# Patient Record
Sex: FEMALE | Race: WHITE | NOT HISPANIC OR LATINO | Employment: UNEMPLOYED | URBAN - METROPOLITAN AREA
[De-identification: names, ages, dates, MRNs, and addresses within clinical notes are randomized per-mention and may not be internally consistent; named-entity substitution may affect disease eponyms.]

---

## 2019-02-28 ENCOUNTER — INITIAL CONSULT (OUTPATIENT)
Dept: RHEUMATOLOGY | Facility: CLINIC | Age: 39
End: 2019-02-28
Payer: COMMERCIAL

## 2019-02-28 VITALS
BODY MASS INDEX: 36.75 KG/M2 | DIASTOLIC BLOOD PRESSURE: 72 MMHG | WEIGHT: 234.13 LBS | HEIGHT: 67 IN | SYSTOLIC BLOOD PRESSURE: 110 MMHG | HEART RATE: 77 BPM

## 2019-02-28 DIAGNOSIS — M79.7 FIBROMYALGIA: Primary | ICD-10-CM

## 2019-02-28 PROCEDURE — 99205 OFFICE O/P NEW HI 60 MIN: CPT | Mod: S$GLB,,, | Performed by: INTERNAL MEDICINE

## 2019-02-28 PROCEDURE — 3008F BODY MASS INDEX DOCD: CPT | Mod: CPTII,S$GLB,, | Performed by: INTERNAL MEDICINE

## 2019-02-28 PROCEDURE — 99205 PR OFFICE/OUTPT VISIT, NEW, LEVL V, 60-74 MIN: ICD-10-PCS | Mod: S$GLB,,, | Performed by: INTERNAL MEDICINE

## 2019-02-28 PROCEDURE — 99999 PR PBB SHADOW E&M-EST. PATIENT-LVL III: ICD-10-PCS | Mod: PBBFAC,,, | Performed by: INTERNAL MEDICINE

## 2019-02-28 PROCEDURE — 3008F PR BODY MASS INDEX (BMI) DOCUMENTED: ICD-10-PCS | Mod: CPTII,S$GLB,, | Performed by: INTERNAL MEDICINE

## 2019-02-28 PROCEDURE — 99999 PR PBB SHADOW E&M-EST. PATIENT-LVL III: CPT | Mod: PBBFAC,,, | Performed by: INTERNAL MEDICINE

## 2019-02-28 RX ORDER — ALBUTEROL SULFATE 90 UG/1
AEROSOL, METERED RESPIRATORY (INHALATION)
COMMUNITY
Start: 2018-12-06 | End: 2021-05-12

## 2019-02-28 RX ORDER — FLUOXETINE HYDROCHLORIDE 20 MG/1
CAPSULE ORAL
COMMUNITY
Start: 2018-12-01 | End: 2019-02-28

## 2019-02-28 RX ORDER — NORGESTIMATE AND ETHINYL ESTRADIOL 0.25-0.035
1 KIT ORAL
COMMUNITY
End: 2019-07-30

## 2019-02-28 RX ORDER — NORGESTIMATE AND ETHINYL ESTRADIOL 0.25-0.035
1 KIT ORAL DAILY
Refills: 3 | COMMUNITY
Start: 2019-01-05 | End: 2019-02-28

## 2019-02-28 RX ORDER — ALBUTEROL SULFATE 90 UG/1
AEROSOL, METERED RESPIRATORY (INHALATION)
COMMUNITY
Start: 2018-12-06 | End: 2019-02-28

## 2019-02-28 RX ORDER — FLUOXETINE HYDROCHLORIDE 20 MG/1
CAPSULE ORAL
COMMUNITY
Start: 2019-02-25 | End: 2021-05-12

## 2019-02-28 RX ORDER — MELOXICAM 15 MG/1
15 TABLET ORAL
COMMUNITY
Start: 2018-12-17 | End: 2019-02-28

## 2019-02-28 RX ORDER — MELOXICAM 15 MG/1
15 TABLET ORAL DAILY
Refills: 5 | COMMUNITY
Start: 2019-01-12 | End: 2019-07-30 | Stop reason: SDUPTHER

## 2019-02-28 RX ORDER — GABAPENTIN 100 MG/1
100 CAPSULE ORAL 3 TIMES DAILY
Qty: 90 CAPSULE | Refills: 6 | Status: SHIPPED | OUTPATIENT
Start: 2019-02-28 | End: 2019-07-30 | Stop reason: SDUPTHER

## 2019-02-28 ASSESSMENT — ROUTINE ASSESSMENT OF PATIENT INDEX DATA (RAPID3)
PSYCHOLOGICAL DISTRESS SCORE: 3.3
PATIENT GLOBAL ASSESSMENT SCORE: 4
TOTAL RAPID3 SCORE: 3.67
PAIN SCORE: 6
MDHAQ FUNCTION SCORE: .3

## 2019-02-28 NOTE — PROGRESS NOTES
Subjective:          Chief Complaint: Pepper Lemon is a 38 y.o. female who had no chief complaint listed for this encounter.    HPI:    Patient is a 38-year-old female being referred by her primary care physician previously seen with rheumatology in [place] for chronic fatigue.   Began 1 year ago she developed Pneumonia and following this she has c/o myalgias, ++fatigue. She endorses non-restorative sleep cycle with young children.   She notes improvement with afternoon nap. With exception of last month where she feels she needs this.   No swelling in her joints.   No specific malar rash. She was found to have some food allegeries she has been doing this for over a year. She does note as she tries restricted foods she begins to feel more fatigued and   She notes dry eye and dry mouth mild  No true weakness. Endorses aches in muscles.   Frontal HA with hx migraine. Seems more frequently.   She notes some tingling in the right hand in median nerve distribution.   She notes with tennis: once weekly she notes tingling in deltoid region   She does endorse some cervical pain. Does see chiropracter some DJD/DDD  She has had some mild elevations in inflammatory markers but her entire autoimmune workup has been negative including FIONA with an FIONA profile.  Most recent C-reactive protein is a 1.4 which is negligible sed rate was normal.  C4 being elevated is not worrisome for anything specific    Patient is currently complaining pain of 4/10 but it is more described as a numbness in her arms fingers with tingling at her neck and upper trapezius region.    She is currently on fluoxetine at 20 mg daily  Meloxicam 15 mg daily.  CPK is normal      Component      Latest Ref Rng & Units 7/21/2018   Thyroperoxidase Antibodies      0.0 - 9.0 IU/mL 1.8   Thyroglobulin Ab Screen      0.0 - 4.0 IU/mL <0.9   SSA Antibody      0 - 40 AU/mL 3   SSA 60 (Ro) ELIEL Antibody      0 - 40 AU/mL 6   Sed Rate      0 - 19 mm/Hr 28 (H)   CRP       0.00 - 0.90 mg/dL 1.60 (H)   Uric Acid      2.4 - 5.7 mg/dL 4.5   FIONA IgG by IFA      <1:80 <1:80   Rheumatoid Factor      0.0 - 15.0 IU/mL <8.6   CCP Antibodies      <5.0 U/mL <0.5   ds DNA Ab      None Detected None Detected   Mitochondrial Ab      0.0 - 20.0 Units 3.7   Smooth Muscle Ab      <1:20 <1:20   HLA B27      Negative Negative   SSB Antibody      0 - 40 AU/mL 2   Complement (C-3)      50 - 180 mg/dL 131   Complement (C-4)      11 - 44 mg/dL 48 (H)     REVIEW OF SYSTEMS:    Review of Systems   Constitutional: Negative for fever, malaise/fatigue and weight loss.   HENT: Negative for sore throat.    Eyes: Negative for double vision, photophobia and redness.   Respiratory: Negative for cough, shortness of breath and wheezing.    Cardiovascular: Negative for chest pain, palpitations and orthopnea.   Gastrointestinal: Negative for abdominal pain, constipation and diarrhea.   Genitourinary: Negative for dysuria, hematuria and urgency.   Musculoskeletal: Positive for joint pain and myalgias. Negative for back pain.   Skin: Negative for rash.   Neurological: Negative for dizziness, tingling, focal weakness and headaches.   Endo/Heme/Allergies: Does not bruise/bleed easily.   Psychiatric/Behavioral: Negative for depression, hallucinations and suicidal ideas.               Objective:            No past medical history on file.  No family history on file.  Social History     Tobacco Use    Smoking status: Not on file   Substance Use Topics    Alcohol use: Not on file    Drug use: Not on file         No current outpatient medications on file prior to visit.     No current facility-administered medications on file prior to visit.        There were no vitals filed for this visit.    Physical Exam:    Physical Exam   Constitutional: She is oriented to person, place, and time. She appears well-developed and well-nourished.   HENT:   Head: Normocephalic and atraumatic.   Mouth/Throat: Oropharynx is clear and moist.    Eyes: EOM are normal. Pupils are equal, round, and reactive to light.   Neck: Normal range of motion.   Cardiovascular: Normal rate, regular rhythm and normal heart sounds.   Pulmonary/Chest: Effort normal and breath sounds normal.   Musculoskeletal:        Right shoulder: She exhibits normal range of motion, no tenderness and no swelling.        Left shoulder: She exhibits normal range of motion, no tenderness and no swelling.        Right elbow: She exhibits normal range of motion and no swelling. No tenderness found.        Left elbow: She exhibits normal range of motion and no swelling. No tenderness found.        Right wrist: She exhibits normal range of motion, no tenderness and no swelling.        Left wrist: She exhibits normal range of motion, no tenderness and no swelling.        Right knee: She exhibits normal range of motion and no swelling. No tenderness found.        Left knee: She exhibits normal range of motion and no swelling. No tenderness found.        Right hand: She exhibits normal range of motion, no tenderness and no swelling.        Left hand: She exhibits normal range of motion, no tenderness and no swelling.        Right foot: There is normal range of motion, no tenderness and no swelling.        Left foot: There is normal range of motion, no tenderness and no swelling.   Neurological: She is alert and oriented to person, place, and time.   Skin: Skin is warm and dry.   Psychiatric: She has a normal mood and affect. Her behavior is normal.             Assessment:       Encounter Diagnosis   Name Primary?    Fibromyalgia Yes          Plan:        Fibromyalgia    Other orders  -     gabapentin (NEURONTIN) 100 MG capsule; Take 1 capsule (100 mg total) by mouth 3 (three) times daily.  Dispense: 90 capsule; Refill: 6    No evidence for any inflammatory arthritis.    Spent time discussing FMS with patient and multidisciplinary approach to therapy with pharmacologic, psychologic, lifestyle  modification and in particular low impact short interval exercise such as walking, junie chi, yoga and swimming.     Symptoms/presentations, non-pharmacologic and pharmacologic treatments and their efficacies were reviewed.   Non-pharmacologic approaches were stressed.  ExPRESS was reviewed  Regular/daily exercise was especially emphasized. Strategies for success were offered.  Cognitive behavioral therapy is very helpful.  Discussed the importance of sleep.   Pharmacologic treatments approved and otherwise were reviewed.  Duloxetine, pregabalin and milnacipran were reviewed.  Patient was provided with written information and referred to a website for further information.    Discussed the importance of sleep. Handout with resources for reading provided to the patient.      No Follow-up on file.      f/u 4-5 months  60min consultation with greater than 50% spent in counseling, chart review and coordination of care. All questions answered.  Thank you for allowing me to participate in the care of this very pleasant patient.

## 2019-02-28 NOTE — LETTER
March 8, 2019      Bebeto Vieyra MD  [address] [facility] [address]           [facility]  [address] [facility] [address] [place] [address]  Phone: [phone]  Fax: [phone]          Patient: Pepper Lemon   MR Number: [id number]   YOB: 1980   Date of Visit: 2/28/2019       Dear Dr. Bebeto Vieyra:    Thank you for referring Pepper Lemon to me for evaluation. Attached you will find relevant portions of my assessment and plan of care.    If you have questions, please do not hesitate to call me. I look forward to following Pepper Lemon along with you.    Sincerely,    Kandice Saab  CC:  No Recipients    If you would like to receive this communication electronically, please contact [email or web address] or [phone] to request more information on [email or web address] Link access.    For providers and/or their staff who would like to refer a patient to [facility], please contact us through our one-stop-shop provider referral line, [facility], at [phone].    If you feel you have received this communication in error or would no longer like to receive these types of communications, please e-mail [email or web address]

## 2019-07-30 ENCOUNTER — OFFICE VISIT (OUTPATIENT)
Dept: RHEUMATOLOGY | Facility: CLINIC | Age: 39
End: 2019-07-30
Payer: COMMERCIAL

## 2019-07-30 VITALS
SYSTOLIC BLOOD PRESSURE: 111 MMHG | WEIGHT: 222.13 LBS | BODY MASS INDEX: 33.67 KG/M2 | HEIGHT: 68 IN | DIASTOLIC BLOOD PRESSURE: 84 MMHG | HEART RATE: 76 BPM

## 2019-07-30 DIAGNOSIS — G56.00 CARPAL TUNNEL SYNDROME, UNSPECIFIED LATERALITY: ICD-10-CM

## 2019-07-30 DIAGNOSIS — M79.7 FIBROMYALGIA: Primary | ICD-10-CM

## 2019-07-30 DIAGNOSIS — M54.2 CERVICALGIA: ICD-10-CM

## 2019-07-30 DIAGNOSIS — M15.9 PRIMARY OSTEOARTHRITIS INVOLVING MULTIPLE JOINTS: ICD-10-CM

## 2019-07-30 PROCEDURE — 3008F PR BODY MASS INDEX (BMI) DOCUMENTED: ICD-10-PCS | Mod: CPTII,S$GLB,, | Performed by: INTERNAL MEDICINE

## 2019-07-30 PROCEDURE — 99214 PR OFFICE/OUTPT VISIT, EST, LEVL IV, 30-39 MIN: ICD-10-PCS | Mod: S$GLB,,, | Performed by: INTERNAL MEDICINE

## 2019-07-30 PROCEDURE — 3008F BODY MASS INDEX DOCD: CPT | Mod: CPTII,S$GLB,, | Performed by: INTERNAL MEDICINE

## 2019-07-30 PROCEDURE — 99214 OFFICE O/P EST MOD 30 MIN: CPT | Mod: S$GLB,,, | Performed by: INTERNAL MEDICINE

## 2019-07-30 PROCEDURE — 99999 PR PBB SHADOW E&M-EST. PATIENT-LVL III: CPT | Mod: PBBFAC,,, | Performed by: INTERNAL MEDICINE

## 2019-07-30 PROCEDURE — 99999 PR PBB SHADOW E&M-EST. PATIENT-LVL III: ICD-10-PCS | Mod: PBBFAC,,, | Performed by: INTERNAL MEDICINE

## 2019-07-30 RX ORDER — MELOXICAM 15 MG/1
15 TABLET ORAL DAILY
Qty: 365 TABLET | Refills: 5 | Status: ON HOLD | OUTPATIENT
Start: 2019-07-30 | End: 2020-10-22 | Stop reason: HOSPADM

## 2019-07-30 RX ORDER — GABAPENTIN 100 MG/1
100 CAPSULE ORAL 3 TIMES DAILY
Qty: 90 CAPSULE | Refills: 6 | Status: SHIPPED | OUTPATIENT
Start: 2019-07-30 | End: 2019-08-29

## 2019-07-30 ASSESSMENT — ROUTINE ASSESSMENT OF PATIENT INDEX DATA (RAPID3)
PAIN SCORE: 6
TOTAL RAPID3 SCORE: 3.89
MDHAQ FUNCTION SCORE: .5
PATIENT GLOBAL ASSESSMENT SCORE: 4
PSYCHOLOGICAL DISTRESS SCORE: 4.4

## 2019-07-30 NOTE — PROGRESS NOTES
Subjective:          Chief Complaint: Pepper Lemon is a 39 y.o. female who had concerns including Fibromyalgia.    HPI:    Patient is a 38-year-old female being referred by her primary care physician  Here today for consultation f/u seen with rheumatology in [place] for chronic fatigue.   Began 1 year ago she developed Pneumonia and following this she has c/o myalgias, ++fatigue. She endorses non-restorative sleep cycle with young children.   She notes improvement with afternoon nap. With exception of last month where she feels she needs this.   No swelling in her joints.   No specific malar rash. She was found to have some food allegeries she has been doing this for over a year. She does note as she tries restricted foods she begins to feel more fatigued and   She notes dry eye and dry mouth mild  No true weakness. Endorses aches in muscles.   Frontal HA with hx migraine. Seems more frequently.   She notes some tingling in the right hand in median nerve distribution.   She notes with tennis: once weekly she notes tingling in deltoid region   She does endorse some cervical pain. Does see chiropracter some DJD/DDD  She has had some mild elevations in inflammatory markers but her entire autoimmune workup has been negative including FIONA with an FIONA profile.  Most recent C-reactive protein is a 1.4 which is negligible sed rate was normal.  C4 being elevated is not worrisome for anything specific    Patient is currently complaining pain of 4/10 but it is more described as a numbness in her arms fingers with tingling at her neck and upper trapezius region.    She is currently on fluoxetine at 20 mg daily  Meloxicam 15 mg daily.  CPK is normal      Component      Latest Ref Rng & Units 7/21/2018   Thyroperoxidase Antibodies      0.0 - 9.0 IU/mL 1.8   Thyroglobulin Ab Screen      0.0 - 4.0 IU/mL <0.9   SSA Antibody      0 - 40 AU/mL 3   SSA 60 (Ro) ELIEL Antibody      0 - 40 AU/mL 6   Sed Rate      0 - 19 mm/Hr 28 (H)    CRP      0.00 - 0.90 mg/dL 1.60 (H)   Uric Acid      2.4 - 5.7 mg/dL 4.5   FIONA IgG by IFA      <1:80 <1:80   Rheumatoid Factor      0.0 - 15.0 IU/mL <8.6   CCP Antibodies      <5.0 U/mL <0.5   ds DNA Ab      None Detected None Detected   Mitochondrial Ab      0.0 - 20.0 Units 3.7   Smooth Muscle Ab      <1:20 <1:20   HLA B27      Negative Negative   SSB Antibody      0 - 40 AU/mL 2   Complement (C-3)      50 - 180 mg/dL 131   Complement (C-4)      11 - 44 mg/dL 48 (H)     REVIEW OF SYSTEMS:    Review of Systems   Constitutional: Negative for fever, malaise/fatigue and weight loss.   HENT: Negative for sore throat.    Eyes: Negative for double vision, photophobia and redness.   Respiratory: Negative for cough, shortness of breath and wheezing.    Cardiovascular: Negative for chest pain, palpitations and orthopnea.   Gastrointestinal: Negative for abdominal pain, constipation and diarrhea.   Genitourinary: Negative for dysuria, hematuria and urgency.   Musculoskeletal: Positive for joint pain and myalgias. Negative for back pain.   Skin: Negative for rash.   Neurological: Negative for dizziness, tingling, focal weakness and headaches.   Endo/Heme/Allergies: Does not bruise/bleed easily.   Psychiatric/Behavioral: Negative for depression, hallucinations and suicidal ideas.               Objective:            Past Medical History:   Diagnosis Date    Anemia      Family History   Problem Relation Age of Onset    Heart disease Father     Ulcerative colitis Sister     Ulcerative colitis Brother     Cancer Maternal Uncle     Diabetes Mellitus Paternal Aunt     Heart disease Maternal Grandmother     Heart disease Maternal Grandfather     Diabetes Mellitus Paternal Grandmother     Heart disease Paternal Grandmother     Thyroid disease Paternal Grandmother      Social History     Tobacco Use    Smoking status: Former Smoker    Smokeless tobacco: Never Used   Substance Use Topics    Alcohol use: Not on file     Drug use: Not on file         Current Outpatient Medications on File Prior to Visit   Medication Sig Dispense Refill    FLUoxetine 20 MG capsule       meloxicam (MOBIC) 15 MG tablet Take 15 mg by mouth once daily.  5    prasterone, DHEA, (DHEA ORAL) Take 5 mg by mouth once daily.      albuterol (PROAIR HFA) 90 mcg/actuation inhaler       DOCOSAHEXANOIC ACID ORAL Take 2 g by mouth.      gabapentin (NEURONTIN) 100 MG capsule Take 1 capsule (100 mg total) by mouth 3 (three) times daily. 90 capsule 6    [DISCONTINUED] norgestimate-ethinyl estradiol (FEMYNOR) 0.25-35 mg-mcg per tablet Take 1 tablet by mouth.       No current facility-administered medications on file prior to visit.        Vitals:    07/30/19 0836   BP: 111/84   Pulse: 76       Physical Exam:    Physical Exam   Constitutional: She is oriented to person, place, and time. She appears well-developed and well-nourished.   HENT:   Head: Normocephalic and atraumatic.   Mouth/Throat: Oropharynx is clear and moist.   Eyes: Pupils are equal, round, and reactive to light. EOM are normal.   Neck: Normal range of motion.   Cardiovascular: Normal rate, regular rhythm and normal heart sounds.   Pulmonary/Chest: Effort normal and breath sounds normal.   Musculoskeletal:        Right shoulder: She exhibits normal range of motion, no tenderness and no swelling.        Left shoulder: She exhibits normal range of motion, no tenderness and no swelling.        Right elbow: She exhibits normal range of motion and no swelling. No tenderness found.        Left elbow: She exhibits normal range of motion and no swelling. No tenderness found.        Right wrist: She exhibits normal range of motion, no tenderness and no swelling.        Left wrist: She exhibits normal range of motion, no tenderness and no swelling.        Right knee: She exhibits normal range of motion and no swelling. No tenderness found.        Left knee: She exhibits normal range of motion and no swelling.  No tenderness found.        Right hand: She exhibits normal range of motion, no tenderness and no swelling.        Left hand: She exhibits normal range of motion, no tenderness and no swelling.        Right foot: There is normal range of motion, no tenderness and no swelling.        Left foot: There is normal range of motion, no tenderness and no swelling.   Neurological: She is alert and oriented to person, place, and time.   Skin: Skin is warm and dry.   Psychiatric: She has a normal mood and affect. Her behavior is normal.             Assessment:       Encounter Diagnoses   Name Primary?    Fibromyalgia Yes    Primary osteoarthritis involving multiple joints     Carpal tunnel syndrome, unspecified laterality     Cervicalgia           Plan:        Fibromyalgia  -     gabapentin (NEURONTIN) 100 MG capsule; Take 1 capsule (100 mg total) by mouth 3 (three) times daily.  Dispense: 90 capsule; Refill: 6  -     meloxicam (MOBIC) 15 MG tablet; Take 1 tablet (15 mg total) by mouth once daily.  Dispense: 365 tablet; Refill: 5    Primary osteoarthritis involving multiple joints  -     Comprehensive metabolic panel; Future; Expected date: 07/30/2019    Carpal tunnel syndrome, unspecified laterality  -     EMG W/ ULTRASOUND AND NERVE CONDUCTION TEST 1 Extremity; Future    Cervicalgia  -     EMG W/ ULTRASOUND AND NERVE CONDUCTION TEST 1 Extremity; Future         No evidence for any inflammatory arthritis.     EMG NCS Right UE.     Follow up in about 4 months (around 11/30/2019).      f/u 4-5 months  30min consultation with greater than 50% spent in counseling, chart review and coordination of care. All questions answered.  Thank you for allowing me to participate in the care of this very pleasant patient.

## 2019-09-12 ENCOUNTER — OFFICE VISIT (OUTPATIENT)
Dept: PHYSICAL MEDICINE AND REHAB | Facility: CLINIC | Age: 39
End: 2019-09-12
Payer: COMMERCIAL

## 2019-09-12 DIAGNOSIS — R20.2 PARESTHESIAS: ICD-10-CM

## 2019-09-12 DIAGNOSIS — M54.2 CERVICALGIA: ICD-10-CM

## 2019-09-12 PROCEDURE — 99499 NO LOS: ICD-10-PCS | Mod: S$GLB,,, | Performed by: PHYSICAL MEDICINE & REHABILITATION

## 2019-09-12 PROCEDURE — 99999 PR PBB SHADOW E&M-EST. PATIENT-LVL II: CPT | Mod: PBBFAC,,, | Performed by: PHYSICAL MEDICINE & REHABILITATION

## 2019-09-12 PROCEDURE — 95886 PR EMG COMPLETE, W/ NERVE CONDUCTION STUDIES, 5+ MUSCLES: ICD-10-PCS | Mod: S$GLB,,, | Performed by: PHYSICAL MEDICINE & REHABILITATION

## 2019-09-12 PROCEDURE — 95910 PR NERVE CONDUCTION STUDY; 7-8 STUDIES: ICD-10-PCS | Mod: S$GLB,,, | Performed by: PHYSICAL MEDICINE & REHABILITATION

## 2019-09-12 PROCEDURE — 95910 NRV CNDJ TEST 7-8 STUDIES: CPT | Mod: S$GLB,,, | Performed by: PHYSICAL MEDICINE & REHABILITATION

## 2019-09-12 PROCEDURE — 99499 UNLISTED E&M SERVICE: CPT | Mod: S$GLB,,, | Performed by: PHYSICAL MEDICINE & REHABILITATION

## 2019-09-12 PROCEDURE — 99999 PR PBB SHADOW E&M-EST. PATIENT-LVL II: ICD-10-PCS | Mod: PBBFAC,,, | Performed by: PHYSICAL MEDICINE & REHABILITATION

## 2019-09-12 PROCEDURE — 95886 MUSC TEST DONE W/N TEST COMP: CPT | Mod: S$GLB,,, | Performed by: PHYSICAL MEDICINE & REHABILITATION

## 2019-09-12 NOTE — PROGRESS NOTES
[facility]  [address] [place] [address]             Full Name: Pepper Lemon Gender: Female  Patient ID: [id number]  History: Pt c/o right sided neck pain with numbness and tingling at the right wrist and hand.      Visit Date: 9/12/2019 08:41  Examining Physician: Neva  Referring Physician: Nitin      Sensory NCS      Nerve / Sites Rec. Site Onset Lat Peak Lat NP Amp PP Amp Segments Distance Velocity     ms ms µV µV  cm m/s   L Median - Digit III (Antidromic)      Wrist Dig III 2.81 3.39 30.1 66.9 Wrist - Dig III 14 50   R Median - Digit III (Antidromic)      Wrist Dig III 2.66 3.28 30.5 59.2 Wrist - Dig III 14 53   L Ulnar - Digit V (Antidromic)      Wrist Dig V 2.76 3.18 11.9 21.4 Wrist - Dig V 14 51   R Ulnar - Digit V (Antidromic)      Wrist Dig V 2.45 3.28 21.5 21.5 Wrist - Dig V 14 57       Combined Sensory Index      Nerve / Sites Rec. Site Peak Lat NP Amp PP Amp Segments Peak Diff     ms µV µV  ms   R Median - CSI      Median palm Wrist 1.93 200.8 196.9        Ulnar palm Wrist 1.77 26.7 26.8        CSI     CSI 0.16       Motor NCS      Nerve / Sites Muscle Latency Amplitude Amp % Duration Segments Distance Lat Diff Velocity     ms mV % ms  cm ms m/s   R Median - APB      Wrist APB 3.65 8.1 100 6.88 Wrist - APB 8        Elbow APB 7.81 10.1 124 7.08 Elbow - Wrist 23 4.17 55   L Median - APB      Wrist APB 3.96 4.9 100 6.98 Wrist - APB 8        Elbow APB 8.39 4.7 95.5 7.29 Elbow - Wrist 25 4.43 56   R Ulnar - ADM      Wrist ADM 2.71 7.2 100 8.91 Wrist - ADM 8        B.Elbow ADM 6.35 6.0 84.1 8.54 B.Elbow - Wrist 18 3.65 49      A.Elbow ADM 8.13 8.0 111 9.38 A.Elbow - B.Elbow 10 1.77 56   R Ulnar - FDI      Wrist FDI 3.39 9.5 100 4.69 Wrist - FDI         B.Elbow FDI 6.98 10.4 110 3.80 B.Elbow - Wrist 18 3.59 50      A.Elbow FDI 8.23 11.2 119 4.69 A.Elbow - B.Elbow 10 1.25 80       EMG         EMG Summary Table     Spontaneous MUAP Recruitment   Muscle IA Fib PSW Fasc H.F. Amp Dur. PPP  Pattern   R. Deltoid N None None None None N N N N   R. Biceps brachii N None None None None N N N N   R. Triceps brachii N None None None None N N N N   R. Pronator teres N None None None None N N N N   R. First dorsal interosseous N None None None None N N N N   R. Abductor pollicis brevis N None None None None N N N N   R. Flexor carpi ulnaris N None None None None N N N N   R. Cervical paraspinals N None None None None           Summary    The motor conduction test was normal in all 4 of the tested nerves: R Median - APB, L Median - APB, R Ulnar - ADM, R Ulnar - FDI.    The sensory conduction test was performed on 5 nerve(s). The results were normal in 4 nerve(s): L Median - Digit III (Antidromic), R Median - Digit III (Antidromic), L Ulnar - Digit V (Antidromic), R Ulnar - Digit V (Antidromic). Findings were unremarkable in 1 nerve(s): R Median - CSI. In the study    The needle EMG study was normal in all 8 tested muscles: R. Deltoid, R. Biceps brachii, R. Triceps brachii, R. Pronator teres, R. First dorsal interosseous, R. Abductor pollicis brevis, R. Flexor carpi ulnaris, R. Cervical paraspinals.      Electrodiagnostic Impression:  1. Normal electrophysiologic study.  2. There was insufficient electrodiagnostic evidence for diagnoses of peripheral polyneuropathy, focal mononeuropathy, myopathy, or active axonal cervical radiculopathy/brachial plexopathy of the right upper extremity.    Plan:  Today's test results will be sent to her referring physician, Dr. Taveras for further review and direction in her treatment.    Thank you very much for the referral. Please call if you have any questions regarding this study or the report.       -------------------------------  Duy Hooks M.D.

## 2019-09-12 NOTE — LETTER
September 12, 2019      Yamini Taveras, DO  [address] [facility] [address] [place] [address]           [facility]  [address] [facility] [address] [place] [address]  Phone: [phone]  Fax: [phone]          Patient: Pepper Lemon   MR Number: [id number]   YOB: 1980   Date of Visit: 9/12/2019       Dear Dr. Yamini Taveras:    Thank you for referring Pepper Lemon to me for evaluation. Attached you will find relevant portions of my assessment and plan of care.    If you have questions, please do not hesitate to call me. I look forward to following Pepper Lemon along with you.    Sincerely,    Duy Hooks MD    Enclosure  CC:  No Recipients    If you would like to receive this communication electronically, please contact [email or web address] or [phone] to request more information on [facility] Link access.    For providers and/or their staff who would like to refer a patient to [facility], please contact us through our one-stop-shop provider referral line, [facility], at [phone].    If you feel you have received this communication in error or would no longer like to receive these types of communications, please e-mail [email or web address]

## 2019-12-05 ENCOUNTER — PATIENT MESSAGE (OUTPATIENT)
Dept: RHEUMATOLOGY | Facility: CLINIC | Age: 39
End: 2019-12-05

## 2019-12-10 ENCOUNTER — OFFICE VISIT (OUTPATIENT)
Dept: RHEUMATOLOGY | Facility: CLINIC | Age: 39
End: 2019-12-10
Payer: COMMERCIAL

## 2019-12-10 VITALS
RESPIRATION RATE: 20 BRPM | BODY MASS INDEX: 34.79 KG/M2 | HEART RATE: 67 BPM | SYSTOLIC BLOOD PRESSURE: 104 MMHG | DIASTOLIC BLOOD PRESSURE: 73 MMHG | WEIGHT: 228.81 LBS

## 2019-12-10 DIAGNOSIS — R53.82 CHRONIC FATIGUE: ICD-10-CM

## 2019-12-10 DIAGNOSIS — M79.7 FIBROMYALGIA: Primary | ICD-10-CM

## 2019-12-10 DIAGNOSIS — R53.81 MALAISE AND FATIGUE: ICD-10-CM

## 2019-12-10 DIAGNOSIS — R53.83 MALAISE AND FATIGUE: ICD-10-CM

## 2019-12-10 PROCEDURE — 3008F PR BODY MASS INDEX (BMI) DOCUMENTED: ICD-10-PCS | Mod: CPTII,S$GLB,, | Performed by: INTERNAL MEDICINE

## 2019-12-10 PROCEDURE — 99999 PR PBB SHADOW E&M-EST. PATIENT-LVL III: CPT | Mod: PBBFAC,,, | Performed by: INTERNAL MEDICINE

## 2019-12-10 PROCEDURE — 3008F BODY MASS INDEX DOCD: CPT | Mod: CPTII,S$GLB,, | Performed by: INTERNAL MEDICINE

## 2019-12-10 PROCEDURE — 99999 PR PBB SHADOW E&M-EST. PATIENT-LVL III: ICD-10-PCS | Mod: PBBFAC,,, | Performed by: INTERNAL MEDICINE

## 2019-12-10 PROCEDURE — 99214 PR OFFICE/OUTPT VISIT, EST, LEVL IV, 30-39 MIN: ICD-10-PCS | Mod: S$GLB,,, | Performed by: INTERNAL MEDICINE

## 2019-12-10 PROCEDURE — 99214 OFFICE O/P EST MOD 30 MIN: CPT | Mod: S$GLB,,, | Performed by: INTERNAL MEDICINE

## 2019-12-10 RX ORDER — GABAPENTIN 100 MG/1
100 CAPSULE ORAL 3 TIMES DAILY
Refills: 6 | COMMUNITY
Start: 2019-10-24 | End: 2021-05-12

## 2019-12-10 ASSESSMENT — ROUTINE ASSESSMENT OF PATIENT INDEX DATA (RAPID3)
PAIN SCORE: 2.5
MDHAQ FUNCTION SCORE: .2
PATIENT GLOBAL ASSESSMENT SCORE: 2.5
TOTAL RAPID3 SCORE: 1.89
PSYCHOLOGICAL DISTRESS SCORE: 4.4

## 2019-12-10 NOTE — PROGRESS NOTES
Subjective:          Chief Complaint: Pepper Lemon is a 39 y.o. female who had no chief complaint listed for this encounter.    HPI:    Patient is a 38-year-old female here for f/u   Here today for consultation f/u seen with rheumatology in [place] for chronic fatigue.   Began 1 year ago she developed Pneumonia and following this she has c/o myalgias, ++fatigue. She endorses non-restorative sleep cycle with young children.   She notes improvement with afternoon nap. With exception of last month where she feels she needs this.   No swelling in her joints.   No specific malar rash. She was found to have some food allegeries she has been doing this for over a year. She does note as she tries restricted foods she begins to feel more fatigued and   She notes dry eye and dry mouth mild  No true weakness. Endorses aches in muscles.   Frontal HA with hx migraine. Seems more frequently.   She notes some tingling in the right hand in median nerve distribution.   She notes with tennis: once weekly she notes tingling in deltoid region   She does endorse some cervical pain. Does see chiropracter some DJD/DDD  She has had some mild elevations in inflammatory markers but her entire autoimmune workup has been negative including FIONA with an FIONA profile.  Most recent C-reactive protein is a 1.4 which is negligible sed rate was normal.  C4 being elevated is not worrisome for anything specific    Last visit.  numbness in her arms fingers with tingling at her neck and upper trapezius region.- EMG/NCS 9/2019 negative for any myopathy or neuropathy.   Started Gabapentin 100mg TID, uses 200mg at HS, sometimes 100mg AM.   Today with 0/10 pain.     She is currently on fluoxetine at 20 mg daily  Meloxicam 15 mg daily.        Component      Latest Ref Rng & Units 7/21/2018   Thyroperoxidase Antibodies      0.0 - 9.0 IU/mL 1.8   Thyroglobulin Ab Screen      0.0 - 4.0 IU/mL <0.9   SSA Antibody      0 - 40 AU/mL 3   SSA 60 (Ro) ELIEL  Antibody      0 - 40 AU/mL 6   Sed Rate      0 - 19 mm/Hr 28 (H)   CRP      0.00 - 0.90 mg/dL 1.60 (H)   Uric Acid      2.4 - 5.7 mg/dL 4.5   FIONA IgG by IFA      <1:80 <1:80   Rheumatoid Factor      0.0 - 15.0 IU/mL <8.6   CCP Antibodies      <5.0 U/mL <0.5   ds DNA Ab      None Detected None Detected   Mitochondrial Ab      0.0 - 20.0 Units 3.7   Smooth Muscle Ab      <1:20 <1:20   HLA B27      Negative Negative   SSB Antibody      0 - 40 AU/mL 2   Complement (C-3)      50 - 180 mg/dL 131   Complement (C-4)      11 - 44 mg/dL 48 (H)     REVIEW OF SYSTEMS:    Review of Systems   Constitutional: Negative for fever, malaise/fatigue and weight loss.   HENT: Negative for sore throat.    Eyes: Negative for double vision, photophobia and redness.   Respiratory: Negative for cough, shortness of breath and wheezing.    Cardiovascular: Negative for chest pain, palpitations and orthopnea.   Gastrointestinal: Negative for abdominal pain, constipation and diarrhea.   Genitourinary: Negative for dysuria, hematuria and urgency.   Musculoskeletal: Positive for joint pain and myalgias. Negative for back pain.   Skin: Negative for rash.   Neurological: Negative for dizziness, tingling, focal weakness and headaches.   Endo/Heme/Allergies: Does not bruise/bleed easily.   Psychiatric/Behavioral: Negative for depression, hallucinations and suicidal ideas.               Objective:            Past Medical History:   Diagnosis Date    Anemia      Family History   Problem Relation Age of Onset    Heart disease Father     Ulcerative colitis Sister     Ulcerative colitis Brother     Cancer Maternal Uncle     Diabetes Mellitus Paternal Aunt     Heart disease Maternal Grandmother     Heart disease Maternal Grandfather     Diabetes Mellitus Paternal Grandmother     Heart disease Paternal Grandmother     Thyroid disease Paternal Grandmother      Social History     Tobacco Use    Smoking status: Former Smoker    Smokeless tobacco:  Never Used   Substance Use Topics    Alcohol use: Not on file    Drug use: Not on file         Current Outpatient Medications on File Prior to Visit   Medication Sig Dispense Refill    albuterol (PROAIR HFA) 90 mcg/actuation inhaler       FLUoxetine 20 MG capsule       gabapentin (NEURONTIN) 100 MG capsule Take 100 mg by mouth 3 (three) times daily.  6    meloxicam (MOBIC) 15 MG tablet Take 1 tablet (15 mg total) by mouth once daily. 365 tablet 5    DOCOSAHEXANOIC ACID ORAL Take 2 g by mouth.      prasterone, DHEA, (DHEA ORAL) Take 5 mg by mouth once daily.       No current facility-administered medications on file prior to visit.        Vitals:    12/10/19 0919   BP: 104/73   Pulse: 67   Resp: 20       Physical Exam:    Physical Exam   Constitutional: She is oriented to person, place, and time. She appears well-developed and well-nourished.   HENT:   Head: Normocephalic and atraumatic.   Mouth/Throat: Oropharynx is clear and moist.   Eyes: Pupils are equal, round, and reactive to light. EOM are normal.   Neck: Normal range of motion.   Cardiovascular: Normal rate, regular rhythm and normal heart sounds.   Pulmonary/Chest: Effort normal and breath sounds normal.   Musculoskeletal:        Right shoulder: She exhibits normal range of motion, no tenderness and no swelling.        Left shoulder: She exhibits normal range of motion, no tenderness and no swelling.        Right elbow: She exhibits normal range of motion and no swelling. No tenderness found.        Left elbow: She exhibits normal range of motion and no swelling. No tenderness found.        Right wrist: She exhibits normal range of motion, no tenderness and no swelling.        Left wrist: She exhibits normal range of motion, no tenderness and no swelling.        Right knee: She exhibits normal range of motion and no swelling. No tenderness found.        Left knee: She exhibits normal range of motion and no swelling. No tenderness found.        Right  hand: She exhibits normal range of motion, no tenderness and no swelling.        Left hand: She exhibits normal range of motion, no tenderness and no swelling.        Right foot: There is normal range of motion, no tenderness and no swelling.        Left foot: There is normal range of motion, no tenderness and no swelling.   Neurological: She is alert and oriented to person, place, and time.   Skin: Skin is warm and dry.   Psychiatric: She has a normal mood and affect. Her behavior is normal.         Electrodiagnostic Impression:  1. Normal electrophysiologic study.  2. There was insufficient electrodiagnostic evidence for diagnoses of peripheral polyneuropathy, focal mononeuropathy, myopathy, or active axonal cervical radiculopathy/brachial plexopathy of the right upper extremity.     Plan:  Today's test results will be sent to her referring physician, Dr. Taveras for further review and direction in her treatment.     Thank you very much for the referral. Please call if you have any questions regarding this study or the report.            Assessment:       Encounter Diagnoses   Name Primary?    Fibromyalgia Yes    Malaise and fatigue     Chronic fatigue           Plan:        Fibromyalgia    Malaise and fatigue    Chronic fatigue         No evidence for any inflammatory arthritis.   Tolerating and noting benefit with gabapentin 200mg at HS, 100mg in AM but not every day  mobic 15mg also PRN.   EMG NCS Right UE. -javi    Follow up in about 5 months (around 5/10/2020).      f/u 4-5 months  30min consultation with greater than 50% spent in counseling, chart review and coordination of care. All questions answered.  Thank you for allowing me to participate in the care of this very pleasant patient.

## 2020-05-11 ENCOUNTER — OFFICE VISIT (OUTPATIENT)
Dept: RHEUMATOLOGY | Facility: CLINIC | Age: 40
End: 2020-05-11
Payer: COMMERCIAL

## 2020-05-11 DIAGNOSIS — M79.7 FIBROMYALGIA: Primary | ICD-10-CM

## 2020-05-11 PROCEDURE — 99214 OFFICE O/P EST MOD 30 MIN: CPT | Mod: 95,,, | Performed by: INTERNAL MEDICINE

## 2020-05-11 PROCEDURE — 99214 PR OFFICE/OUTPT VISIT, EST, LEVL IV, 30-39 MIN: ICD-10-PCS | Mod: 95,,, | Performed by: INTERNAL MEDICINE

## 2020-05-11 NOTE — PROGRESS NOTES
Subjective:          Chief Complaint: Pepper Lemon is a 40 y.o. female who had concerns including Disease Management.    HPI:  The patient location is: Home [place]  The chief complaint leading to consultation is: medication management and f/u   Visit type: Virtual visit with synchronous audio and video  Total time spent with patient: 25  Each patient to whom he or she provides medical services by telemedicine is:  (1) informed of the relationship between the physician and patient and the respective role of any other health care provider with respect to management of the patient; and (2) notified that he or she may decline to receive medical services by telemedicine and may withdraw from such care at any time.    Notes:     Patient is a 40-year-old female here for f/u currently 14 weeks pregnant.     Began 1 year ago she developed Pneumonia and following this she has c/o myalgias, ++fatigue. She endorses non-restorative sleep cycle with young children.   She notes improvement with afternoon nap. With exception of last month where she feels she needs this.   No swelling in her joints.   No specific malar rash. She was found to have some food allegeries she has been doing this for over a year. She does note as she tries restricted foods she begins to feel more fatigued and   She notes dry eye and dry mouth mild  No true weakness. Endorses aches in muscles.   Frontal HA with hx migraine. Seems more frequently.   She notes some tingling in the right hand in median nerve distribution.   She notes with tennis: once weekly she notes tingling in deltoid region   She does endorse some cervical pain. Does see chiropracter some DJD/DDD  She has had some mild elevations in inflammatory markers but her entire autoimmune workup has been negative including FIONA with an FIONA profile.  Most recent C-reactive protein is a 1.4 which is negligible sed rate was normal.  C4 being elevated is not worrisome for anything specific    Last  visit.  numbness in her arms fingers with tingling at her neck and upper trapezius region.- EMG/NCS 9/2019 negative for any myopathy or neuropathy.   Worst when lying on either side with numbness in the arm on the side she is lying on only. Occurs in both arms. Never to rare during the daytime.   Today with 2/10 pain.       She is currently on  Started Gabapentin 100mg TID, uses 200mg at HS, sometimes 100mg AM. Did well dropped to PRN perhaps once weekly.    fluoxetine at 20 mg daily  Meloxicam 15 mg daily.        Component      Latest Ref Rng & Units 7/21/2018   Thyroperoxidase Antibodies      0.0 - 9.0 IU/mL 1.8   Thyroglobulin Ab Screen      0.0 - 4.0 IU/mL <0.9   SSA Antibody      0 - 40 AU/mL 3   SSA 60 (Ro) ELIEL Antibody      0 - 40 AU/mL 6   Sed Rate      0 - 19 mm/Hr 28 (H)   CRP      0.00 - 0.90 mg/dL 1.60 (H)   Uric Acid      2.4 - 5.7 mg/dL 4.5   FIONA IgG by IFA      <1:80 <1:80   Rheumatoid Factor      0.0 - 15.0 IU/mL <8.6   CCP Antibodies      <5.0 U/mL <0.5   ds DNA Ab      None Detected None Detected   Mitochondrial Ab      0.0 - 20.0 Units 3.7   Smooth Muscle Ab      <1:20 <1:20   HLA B27      Negative Negative   SSB Antibody      0 - 40 AU/mL 2   Complement (C-3)      50 - 180 mg/dL 131   Complement (C-4)      11 - 44 mg/dL 48 (H)     REVIEW OF SYSTEMS:    Review of Systems   Constitutional: Negative for fever, malaise/fatigue and weight loss.   HENT: Negative for sore throat.    Eyes: Negative for double vision, photophobia and redness.   Respiratory: Negative for cough, shortness of breath and wheezing.    Cardiovascular: Negative for chest pain, palpitations and orthopnea.   Gastrointestinal: Negative for abdominal pain, constipation and diarrhea.   Genitourinary: Negative for dysuria, hematuria and urgency.   Musculoskeletal: Positive for joint pain and myalgias. Negative for back pain.   Skin: Negative for rash.   Neurological: Negative for dizziness, tingling, focal weakness and headaches.    Endo/Heme/Allergies: Does not bruise/bleed easily.   Psychiatric/Behavioral: Negative for depression, hallucinations and suicidal ideas.               Objective:            Past Medical History:   Diagnosis Date    Anemia      Family History   Problem Relation Age of Onset    Heart disease Father     Ulcerative colitis Sister     Ulcerative colitis Brother     Cancer Maternal Uncle     Diabetes Mellitus Paternal Aunt     Heart disease Maternal Grandmother     Heart disease Maternal Grandfather     Diabetes Mellitus Paternal Grandmother     Heart disease Paternal Grandmother     Thyroid disease Paternal Grandmother      Social History     Tobacco Use    Smoking status: Former Smoker    Smokeless tobacco: Never Used   Substance Use Topics    Alcohol use: Not on file    Drug use: Not on file         Current Outpatient Medications on File Prior to Visit   Medication Sig Dispense Refill    prenat.vits,luis,min-iron-folic Tab Take 1 tablet by mouth once daily.      albuterol (PROAIR HFA) 90 mcg/actuation inhaler       FLUoxetine 20 MG capsule       gabapentin (NEURONTIN) 100 MG capsule Take 100 mg by mouth 3 (three) times daily.  6    meloxicam (MOBIC) 15 MG tablet Take 1 tablet (15 mg total) by mouth once daily. 365 tablet 5     No current facility-administered medications on file prior to visit.        There were no vitals filed for this visit.    Physical Exam:    Physical Exam   Constitutional: She is oriented to person, place, and time. She appears well-developed and well-nourished.   Eyes: Lids are normal.   Neurological: She is oriented to person, place, and time.   Psychiatric: She has a normal mood and affect. Her behavior is normal. Thought content normal.         Electrodiagnostic Impression:  1. Normal electrophysiologic study.  2. There was insufficient electrodiagnostic evidence for diagnoses of peripheral polyneuropathy, focal mononeuropathy, myopathy, or active axonal cervical  radiculopathy/brachial plexopathy of the right upper extremity.     Plan:  Today's test results will be sent to her referring physician, Dr. Taveras for further review and direction in her treatment.     Thank you very much for the referral. Please call if you have any questions regarding this study or the report.            Assessment:       Encounter Diagnosis   Name Primary?    Fibromyalgia Yes          Plan:        Fibromyalgia     No evidence for any inflammatory arthritis.   Off all meds 14 weeks pregnant.   mobic 15mg also PRN.   EMG NCS Right UE. -normal    Follow up in about 4 months (around 9/11/2020).      f/u 4-5 months  25min consultation with greater than 50% spent in counseling, chart review and coordination of care. All questions answered.  Thank you for allowing me to participate in the care of this very pleasant patient.

## 2020-09-30 PROBLEM — O46.93 VAGINAL BLEEDING IN PREGNANCY, THIRD TRIMESTER: Status: ACTIVE | Noted: 2020-09-30

## 2020-10-22 PROBLEM — O44.00 COMPLETE PLACENTA PREVIA NOS OR WITHOUT HEMORRHAGE, UNSPECIFIED TRIMESTER: Status: ACTIVE | Noted: 2020-10-22

## 2020-10-22 PROBLEM — O34.211 MATERNAL CARE DUE TO LOW TRANSVERSE UTERINE SCAR FROM PREVIOUS CESAREAN DELIVERY: Status: ACTIVE | Noted: 2020-10-22

## 2020-10-22 PROBLEM — I10 HBP (HIGH BLOOD PRESSURE): Status: ACTIVE | Noted: 2020-10-22

## 2020-11-04 ENCOUNTER — PATIENT MESSAGE (OUTPATIENT)
Dept: RHEUMATOLOGY | Facility: CLINIC | Age: 40
End: 2020-11-04

## 2020-11-11 ENCOUNTER — OFFICE VISIT (OUTPATIENT)
Dept: RHEUMATOLOGY | Facility: CLINIC | Age: 40
End: 2020-11-11
Payer: COMMERCIAL

## 2020-11-11 VITALS
BODY MASS INDEX: 36.52 KG/M2 | HEART RATE: 91 BPM | HEIGHT: 68 IN | SYSTOLIC BLOOD PRESSURE: 104 MMHG | WEIGHT: 240.94 LBS | DIASTOLIC BLOOD PRESSURE: 73 MMHG

## 2020-11-11 DIAGNOSIS — M79.7 FIBROMYALGIA: Primary | ICD-10-CM

## 2020-11-11 PROCEDURE — 3008F PR BODY MASS INDEX (BMI) DOCUMENTED: ICD-10-PCS | Mod: CPTII,S$GLB,, | Performed by: INTERNAL MEDICINE

## 2020-11-11 PROCEDURE — 3008F BODY MASS INDEX DOCD: CPT | Mod: CPTII,S$GLB,, | Performed by: INTERNAL MEDICINE

## 2020-11-11 PROCEDURE — 99214 PR OFFICE/OUTPT VISIT, EST, LEVL IV, 30-39 MIN: ICD-10-PCS | Mod: S$GLB,,, | Performed by: INTERNAL MEDICINE

## 2020-11-11 PROCEDURE — 99214 OFFICE O/P EST MOD 30 MIN: CPT | Mod: S$GLB,,, | Performed by: INTERNAL MEDICINE

## 2020-11-11 PROCEDURE — 99999 PR PBB SHADOW E&M-EST. PATIENT-LVL III: ICD-10-PCS | Mod: PBBFAC,,, | Performed by: INTERNAL MEDICINE

## 2020-11-11 PROCEDURE — 99999 PR PBB SHADOW E&M-EST. PATIENT-LVL III: CPT | Mod: PBBFAC,,, | Performed by: INTERNAL MEDICINE

## 2020-11-11 RX ORDER — METHOCARBAMOL 500 MG/1
500 TABLET, FILM COATED ORAL 4 TIMES DAILY
Qty: 40 TABLET | Refills: 0 | Status: SHIPPED | OUTPATIENT
Start: 2020-11-11 | End: 2020-11-21

## 2020-11-11 RX ORDER — MELOXICAM 15 MG/1
15 TABLET ORAL DAILY
COMMUNITY
End: 2021-05-20 | Stop reason: SDUPTHER

## 2020-11-11 ASSESSMENT — ROUTINE ASSESSMENT OF PATIENT INDEX DATA (RAPID3)
PSYCHOLOGICAL DISTRESS SCORE: 3.3
TOTAL RAPID3 SCORE: 3.67
MDHAQ FUNCTION SCORE: 1.2
PATIENT GLOBAL ASSESSMENT SCORE: 3
PAIN SCORE: 4
FATIGUE SCORE: 2.2

## 2020-11-11 NOTE — PROGRESS NOTES
Subjective:          Chief Complaint: Pepper Lemon is a 40 y.o. female who had concerns including Disease Management.    HPI:  The patient location is: Home [place]  The chief complaint leading to consultation is: medication management and f/u   Visit type: Virtual visit with synchronous audio and video  Total time spent with patient: 25  Each patient to whom he or she provides medical services by telemedicine is:  (1) informed of the relationship between the physician and patient and the respective role of any other health care provider with respect to management of the patient; and (2) notified that he or she may decline to receive medical services by telemedicine and may withdraw from such care at any time.    Notes:     Began 1 year ago she developed Pneumonia and following this she has c/o myalgias, ++fatigue. She endorses non-restorative sleep cycle with young children.   She notes improvement with afternoon nap. With exception of last month where she feels she needs this.   No swelling in her joints.   No specific malar rash. She was found to have some food allegeries she has been doing this for over a year. She does note as she tries restricted foods she begins to feel more fatigued and   She notes dry eye and dry mouth mild  No true weakness. Endorses aches in muscles.   Frontal HA with hx migraine. Seems more frequently.   She notes some tingling in the right hand in median nerve distribution.   She notes with tennis: once weekly she notes tingling in deltoid region   She does endorse some cervical pain. Does see chiropracter some DJD/DDD  She has had some mild elevations in inflammatory markers but her entire autoimmune workup has been negative including FIONA with an FIONA profile.  Most recent C-reactive protein is a 1.4 which is negligible sed rate was normal.  C4 being elevated is not worrisome for anything specific    Last visit.  numbness in her arms fingers with tingling at her neck and upper trapezius  region.- EMG/NCS 9/2019 negative for any myopathy or neuropathy.   Worst when lying on either side with numbness in the arm on the side she is lying on only. Occurs in both arms. Never to rare during the daytime.   Today with 2/10 pain.       She is currently on  Started Gabapentin 100mg TID, uses 200mg at HS, sometimes 100mg AM. Did well dropped to PRN perhaps once weekly.    fluoxetine at 20 mg daily  Meloxicam 15 mg daily.        Component      Latest Ref Rng & Units 7/21/2018   Thyroperoxidase Antibodies      0.0 - 9.0 IU/mL 1.8   Thyroglobulin Ab Screen      0.0 - 4.0 IU/mL <0.9   SSA Antibody      0 - 40 AU/mL 3   SSA 60 (Ro) ELIEL Antibody      0 - 40 AU/mL 6   Sed Rate      0 - 19 mm/Hr 28 (H)   CRP      0.00 - 0.90 mg/dL 1.60 (H)   Uric Acid      2.4 - 5.7 mg/dL 4.5   FIONA IgG by IFA      <1:80 <1:80   Rheumatoid Factor      0.0 - 15.0 IU/mL <8.6   CCP Antibodies      <5.0 U/mL <0.5   ds DNA Ab      None Detected None Detected   Mitochondrial Ab      0.0 - 20.0 Units 3.7   Smooth Muscle Ab      <1:20 <1:20   HLA B27      Negative Negative   SSB Antibody      0 - 40 AU/mL 2   Complement (C-3)      50 - 180 mg/dL 131   Complement (C-4)      11 - 44 mg/dL 48 (H)     REVIEW OF SYSTEMS:    Review of Systems   Constitutional: Negative for fever, malaise/fatigue and weight loss.   HENT: Negative for sore throat.    Eyes: Negative for double vision, photophobia and redness.   Respiratory: Negative for cough, shortness of breath and wheezing.    Cardiovascular: Negative for chest pain, palpitations and orthopnea.   Gastrointestinal: Negative for abdominal pain, constipation and diarrhea.   Genitourinary: Negative for dysuria, hematuria and urgency.   Musculoskeletal: Positive for joint pain and myalgias. Negative for back pain.   Skin: Negative for rash.   Neurological: Negative for dizziness, tingling, focal weakness and headaches.   Endo/Heme/Allergies: Does not bruise/bleed easily.   Psychiatric/Behavioral: Negative  for depression, hallucinations and suicidal ideas.               Objective:            Past Medical History:   Diagnosis Date    Anemia     Fibromyalgia     Placenta previa      Family History   Problem Relation Age of Onset    Heart disease Father     Ulcerative colitis Sister     Ulcerative colitis Brother     Cancer Maternal Uncle     Diabetes Mellitus Paternal Aunt     Heart disease Maternal Grandmother     Heart disease Maternal Grandfather     Diabetes Mellitus Paternal Grandmother     Heart disease Paternal Grandmother     Thyroid disease Paternal Grandmother      Social History     Tobacco Use    Smoking status: Former Smoker     Quit date:      Years since quittin.8    Smokeless tobacco: Never Used   Substance Use Topics    Alcohol use: Not on file    Drug use: Not on file         Current Outpatient Medications on File Prior to Visit   Medication Sig Dispense Refill    meloxicam (MOBIC) 15 MG tablet Take 15 mg by mouth once daily.      albuterol (PROAIR HFA) 90 mcg/actuation inhaler       FLUoxetine 20 MG capsule       gabapentin (NEURONTIN) 100 MG capsule Take 100 mg by mouth 3 (three) times daily.  6    ibuprofen (ADVIL,MOTRIN) 800 MG tablet Take 1 tablet (800 mg total) by mouth every 8 (eight) hours. 30 tablet 0    oxyCODONE-acetaminophen (PERCOCET) 5-325 mg per tablet Take 2 tablets by mouth every 4 (four) hours as needed (pain rated 1-6 on pain scale). 30 tablet 0    prenat.vits,luis,min-iron-folic Tab Take 1 tablet by mouth once daily.       No current facility-administered medications on file prior to visit.        Vitals:    20 0836   BP: 104/73   Pulse: 91       Physical Exam:    Physical Exam  Constitutional:       Appearance: She is well-developed.   Eyes:      General: Lids are normal.   Neurological:      Mental Status: She is oriented to person, place, and time.   Psychiatric:         Behavior: Behavior normal.         Thought Content: Thought content  normal.           Electrodiagnostic Impression:  1. Normal electrophysiologic study.  2. There was insufficient electrodiagnostic evidence for diagnoses of peripheral polyneuropathy, focal mononeuropathy, myopathy, or active axonal cervical radiculopathy/brachial plexopathy of the right upper extremity.     Plan:  Today's test results will be sent to her referring physician, Dr. Taveras for further review and direction in her treatment.     Thank you very much for the referral. Please call if you have any questions regarding this study or the report.            Assessment:       Encounter Diagnosis   Name Primary?    Fibromyalgia Yes          Plan:        Fibromyalgia  -     methocarbamoL (ROBAXIN) 500 MG Tab; Take 1 tablet (500 mg total) by mouth 4 (four) times daily. for 10 days  Dispense: 40 tablet; Refill: 0     No evidence for any inflammatory arthritis.     mobic 15mg also PRN.   Robaxin:   EMG NCS Right UE. -normal    Follow up in about 4 months (around 3/11/2021).      f/u 4-5 months  25min consultation with greater than 50% spent in counseling, chart review and coordination of care. All questions answered.  Thank you for allowing me to participate in the care of this very pleasant patient.

## 2020-11-12 ENCOUNTER — PATIENT MESSAGE (OUTPATIENT)
Dept: ADMINISTRATIVE | Facility: OTHER | Age: 40
End: 2020-11-12

## 2021-05-10 ENCOUNTER — PATIENT MESSAGE (OUTPATIENT)
Dept: RESEARCH | Facility: HOSPITAL | Age: 41
End: 2021-05-10

## 2021-05-12 ENCOUNTER — OFFICE VISIT (OUTPATIENT)
Dept: RHEUMATOLOGY | Facility: CLINIC | Age: 41
End: 2021-05-12
Payer: COMMERCIAL

## 2021-05-12 VITALS
HEIGHT: 68 IN | SYSTOLIC BLOOD PRESSURE: 121 MMHG | DIASTOLIC BLOOD PRESSURE: 83 MMHG | WEIGHT: 256.94 LBS | BODY MASS INDEX: 38.94 KG/M2 | HEART RATE: 89 BPM

## 2021-05-12 DIAGNOSIS — M79.7 FIBROMYALGIA: Primary | ICD-10-CM

## 2021-05-12 DIAGNOSIS — M79.10 MYALGIA: ICD-10-CM

## 2021-05-12 PROCEDURE — 99999 PR PBB SHADOW E&M-EST. PATIENT-LVL III: CPT | Mod: PBBFAC,,, | Performed by: INTERNAL MEDICINE

## 2021-05-12 PROCEDURE — 99999 PR PBB SHADOW E&M-EST. PATIENT-LVL III: ICD-10-PCS | Mod: PBBFAC,,, | Performed by: INTERNAL MEDICINE

## 2021-05-12 PROCEDURE — 99214 OFFICE O/P EST MOD 30 MIN: CPT | Mod: S$GLB,,, | Performed by: INTERNAL MEDICINE

## 2021-05-12 PROCEDURE — 3008F BODY MASS INDEX DOCD: CPT | Mod: CPTII,S$GLB,, | Performed by: INTERNAL MEDICINE

## 2021-05-12 PROCEDURE — 1125F PR PAIN SEVERITY QUANTIFIED, PAIN PRESENT: ICD-10-PCS | Mod: S$GLB,,, | Performed by: INTERNAL MEDICINE

## 2021-05-12 PROCEDURE — 99214 PR OFFICE/OUTPT VISIT, EST, LEVL IV, 30-39 MIN: ICD-10-PCS | Mod: S$GLB,,, | Performed by: INTERNAL MEDICINE

## 2021-05-12 PROCEDURE — 3008F PR BODY MASS INDEX (BMI) DOCUMENTED: ICD-10-PCS | Mod: CPTII,S$GLB,, | Performed by: INTERNAL MEDICINE

## 2021-05-12 PROCEDURE — 1125F AMNT PAIN NOTED PAIN PRSNT: CPT | Mod: S$GLB,,, | Performed by: INTERNAL MEDICINE

## 2021-05-12 RX ORDER — METHOCARBAMOL 500 MG/1
500 TABLET, FILM COATED ORAL 2 TIMES DAILY
Qty: 60 TABLET | Refills: 6 | Status: SHIPPED | OUTPATIENT
Start: 2021-05-12 | End: 2022-06-06

## 2021-05-12 RX ORDER — GABAPENTIN 100 MG/1
100 CAPSULE ORAL 3 TIMES DAILY
Qty: 90 CAPSULE | Refills: 11 | Status: SHIPPED | OUTPATIENT
Start: 2021-05-12 | End: 2022-08-18 | Stop reason: SDUPTHER

## 2021-05-12 ASSESSMENT — ROUTINE ASSESSMENT OF PATIENT INDEX DATA (RAPID3)
PATIENT GLOBAL ASSESSMENT SCORE: 4
PAIN SCORE: 6
TOTAL RAPID3 SCORE: 4
MDHAQ FUNCTION SCORE: 0.6
PSYCHOLOGICAL DISTRESS SCORE: 5.5

## 2021-05-20 ENCOUNTER — PATIENT MESSAGE (OUTPATIENT)
Dept: RHEUMATOLOGY | Facility: CLINIC | Age: 41
End: 2021-05-20

## 2021-05-27 RX ORDER — MELOXICAM 15 MG/1
15 TABLET ORAL DAILY
Qty: 30 TABLET | Refills: 6 | Status: SHIPPED | OUTPATIENT
Start: 2021-05-27 | End: 2021-11-25

## 2022-08-18 ENCOUNTER — OFFICE VISIT (OUTPATIENT)
Dept: RHEUMATOLOGY | Facility: CLINIC | Age: 42
End: 2022-08-18
Payer: COMMERCIAL

## 2022-08-18 VITALS
HEART RATE: 70 BPM | HEIGHT: 68 IN | SYSTOLIC BLOOD PRESSURE: 108 MMHG | BODY MASS INDEX: 33.28 KG/M2 | WEIGHT: 219.56 LBS | DIASTOLIC BLOOD PRESSURE: 75 MMHG

## 2022-08-18 DIAGNOSIS — M79.7 FIBROMYALGIA: ICD-10-CM

## 2022-08-18 DIAGNOSIS — M79.10 MYALGIA: ICD-10-CM

## 2022-08-18 PROCEDURE — 99999 PR PBB SHADOW E&M-EST. PATIENT-LVL III: CPT | Mod: PBBFAC,,, | Performed by: INTERNAL MEDICINE

## 2022-08-18 PROCEDURE — 3044F HG A1C LEVEL LT 7.0%: CPT | Mod: CPTII,S$GLB,, | Performed by: INTERNAL MEDICINE

## 2022-08-18 PROCEDURE — 99214 PR OFFICE/OUTPT VISIT, EST, LEVL IV, 30-39 MIN: ICD-10-PCS | Mod: S$GLB,,, | Performed by: INTERNAL MEDICINE

## 2022-08-18 PROCEDURE — 3044F PR MOST RECENT HEMOGLOBIN A1C LEVEL <7.0%: ICD-10-PCS | Mod: CPTII,S$GLB,, | Performed by: INTERNAL MEDICINE

## 2022-08-18 PROCEDURE — 3008F BODY MASS INDEX DOCD: CPT | Mod: CPTII,S$GLB,, | Performed by: INTERNAL MEDICINE

## 2022-08-18 PROCEDURE — 1159F PR MEDICATION LIST DOCUMENTED IN MEDICAL RECORD: ICD-10-PCS | Mod: CPTII,S$GLB,, | Performed by: INTERNAL MEDICINE

## 2022-08-18 PROCEDURE — 1159F MED LIST DOCD IN RCRD: CPT | Mod: CPTII,S$GLB,, | Performed by: INTERNAL MEDICINE

## 2022-08-18 PROCEDURE — 3078F DIAST BP <80 MM HG: CPT | Mod: CPTII,S$GLB,, | Performed by: INTERNAL MEDICINE

## 2022-08-18 PROCEDURE — 3074F SYST BP LT 130 MM HG: CPT | Mod: CPTII,S$GLB,, | Performed by: INTERNAL MEDICINE

## 2022-08-18 PROCEDURE — 3078F PR MOST RECENT DIASTOLIC BLOOD PRESSURE < 80 MM HG: ICD-10-PCS | Mod: CPTII,S$GLB,, | Performed by: INTERNAL MEDICINE

## 2022-08-18 PROCEDURE — 1160F RVW MEDS BY RX/DR IN RCRD: CPT | Mod: CPTII,S$GLB,, | Performed by: INTERNAL MEDICINE

## 2022-08-18 PROCEDURE — 1160F PR REVIEW ALL MEDS BY PRESCRIBER/CLIN PHARMACIST DOCUMENTED: ICD-10-PCS | Mod: CPTII,S$GLB,, | Performed by: INTERNAL MEDICINE

## 2022-08-18 PROCEDURE — 99999 PR PBB SHADOW E&M-EST. PATIENT-LVL III: ICD-10-PCS | Mod: PBBFAC,,, | Performed by: INTERNAL MEDICINE

## 2022-08-18 PROCEDURE — 3008F PR BODY MASS INDEX (BMI) DOCUMENTED: ICD-10-PCS | Mod: CPTII,S$GLB,, | Performed by: INTERNAL MEDICINE

## 2022-08-18 PROCEDURE — 3074F PR MOST RECENT SYSTOLIC BLOOD PRESSURE < 130 MM HG: ICD-10-PCS | Mod: CPTII,S$GLB,, | Performed by: INTERNAL MEDICINE

## 2022-08-18 PROCEDURE — 99214 OFFICE O/P EST MOD 30 MIN: CPT | Mod: S$GLB,,, | Performed by: INTERNAL MEDICINE

## 2022-08-18 RX ORDER — FLUOXETINE HYDROCHLORIDE 20 MG/1
20 CAPSULE ORAL
COMMUNITY
Start: 2022-04-25

## 2022-08-18 RX ORDER — METHOCARBAMOL 500 MG/1
500 TABLET, FILM COATED ORAL 2 TIMES DAILY
Qty: 60 TABLET | Refills: 6 | Status: SHIPPED | OUTPATIENT
Start: 2022-08-18 | End: 2023-08-18

## 2022-08-18 RX ORDER — GABAPENTIN 100 MG/1
100 CAPSULE ORAL 3 TIMES DAILY
Qty: 90 CAPSULE | Refills: 11 | Status: SHIPPED | OUTPATIENT
Start: 2022-08-18 | End: 2023-08-18

## 2022-08-18 RX ORDER — MELOXICAM 15 MG/1
15 TABLET ORAL DAILY
Qty: 30 TABLET | Refills: 6 | Status: SHIPPED | OUTPATIENT
Start: 2022-08-18 | End: 2023-08-18

## 2022-08-18 RX ORDER — ACETAMINOPHEN 500 MG
2 TABLET ORAL
COMMUNITY

## 2022-08-18 ASSESSMENT — ROUTINE ASSESSMENT OF PATIENT INDEX DATA (RAPID3)
PSYCHOLOGICAL DISTRESS SCORE: 3.3
TOTAL RAPID3 SCORE: 2.94
PATIENT GLOBAL ASSESSMENT SCORE: 2.5
MDHAQ FUNCTION SCORE: 0.7
PAIN SCORE: 4
FATIGUE SCORE: 2.2

## 2022-08-18 NOTE — PROGRESS NOTES
Subjective:          Chief Complaint: Pepper Lemon is a 42 y.o. female who had concerns including Disease Management.    HPI:      Patient is a 42-year-old female last office visit 05/20/2021     She has c/o myalgias, ++fatigue. She endorses non-restorative sleep cycle with young children.     No swelling in her joints.   No specific malar rash. She was found to have some food allegeries she has been doing this for over a year. She does note as she tries restricted foods she begins to feel more fatigued and   She notes dry eye and dry mouth mild  No true weakness. Endorses aches in muscles.   Frontal HA with hx migraine. Seems more frequently.   She notes some tingling in the right hand in median nerve distribution.   She notes with tennis: once weekly she notes tingling in deltoid region   She does endorse some cervical pain. Does see chiropracter some DJD/DDD  She has had some mild elevations in inflammatory markers but her entire autoimmune workup has been negative including FIONA with an FIONA profile.  +TPO and +Tg.     Patient   She is currently on  Started Gabapentin 100mg TID, uses 200mg at HS, sometimes 100mg AM. Did well dropped to PRN perhaps once weekly.    fluoxetine at 20 mg daily  Meloxicam 15 mg PRN only.   Methocarbamol 500mg BID- mostly uses at night.       Component      Latest Ref Rng & Units 7/21/2018   Thyroperoxidase Antibodies      0.0 - 9.0 IU/mL 1.8   Thyroglobulin Ab Screen      0.0 - 4.0 IU/mL <0.9   SSA Antibody      0 - 40 AU/mL 3   SSA 60 (Ro) ELIEL Antibody      0 - 40 AU/mL 6   Sed Rate      0 - 19 mm/Hr 28 (H)   CRP      0.00 - 0.90 mg/dL 1.60 (H)   Uric Acid      2.4 - 5.7 mg/dL 4.5   FIONA IgG by IFA      <1:80 <1:80   Rheumatoid Factor      0.0 - 15.0 IU/mL <8.6   CCP Antibodies      <5.0 U/mL <0.5   ds DNA Ab      None Detected None Detected   Mitochondrial Ab      0.0 - 20.0 Units 3.7   Smooth Muscle Ab      <1:20 <1:20   HLA B27      Negative Negative   SSB Antibody      0 - 40  AU/mL 2   Complement (C-3)      50 - 180 mg/dL 131   Complement (C-4)      11 - 44 mg/dL 48 (H)     REVIEW OF SYSTEMS:    Review of Systems   Constitutional: Negative for fever, malaise/fatigue and weight loss.   HENT: Negative for sore throat.    Eyes: Negative for double vision, photophobia and redness.   Respiratory: Negative for cough, shortness of breath and wheezing.    Cardiovascular: Negative for chest pain, palpitations and orthopnea.   Gastrointestinal: Negative for abdominal pain, constipation and diarrhea.   Genitourinary: Negative for dysuria, hematuria and urgency.   Musculoskeletal: Positive for joint pain and myalgias. Negative for back pain.   Skin: Negative for rash.   Neurological: Negative for dizziness, tingling, focal weakness and headaches.   Endo/Heme/Allergies: Does not bruise/bleed easily.   Psychiatric/Behavioral: Negative for depression, hallucinations and suicidal ideas.               Objective:            Past Medical History:   Diagnosis Date    Anemia     Fibromyalgia     Placenta previa      Family History   Problem Relation Age of Onset    Heart disease Father     Ulcerative colitis Sister     Ulcerative colitis Brother     Cancer Maternal Uncle     Diabetes Mellitus Paternal Aunt     Heart disease Maternal Grandmother     Heart disease Maternal Grandfather     Diabetes Mellitus Paternal Grandmother     Heart disease Paternal Grandmother     Thyroid disease Paternal Grandmother      Social History     Tobacco Use    Smoking status: Former Smoker     Quit date:      Years since quittin.6    Smokeless tobacco: Never Used         Current Outpatient Medications on File Prior to Visit   Medication Sig Dispense Refill    FLUoxetine 20 MG capsule Take 20 mg by mouth.      gabapentin (NEURONTIN) 100 MG capsule Take 1 capsule (100 mg total) by mouth 3 (three) times daily. 90 capsule 11    meloxicam (MOBIC) 15 MG tablet TAKE 1 TABLET BY MOUTH EVERY DAY 30 tablet 6     methocarbamoL (ROBAXIN) 500 MG Tab TAKE 1 TABLET BY MOUTH TWICE A DAY 60 tablet 6    omega 3-dha-epa-fish oil 300-1,000 mg Cap Take 2 g by mouth.       No current facility-administered medications on file prior to visit.       Vitals:    08/18/22 0830   BP: 108/75   Pulse: 70       Physical Exam:    Physical Exam  Constitutional:       Appearance: She is well-developed.   Eyes:      General: Lids are normal.   Neurological:      Mental Status: She is oriented to person, place, and time.   Psychiatric:         Behavior: Behavior normal.         Thought Content: Thought content normal.           Electrodiagnostic Impression:  1. Normal electrophysiologic study.  2. There was insufficient electrodiagnostic evidence for diagnoses of peripheral polyneuropathy, focal mononeuropathy, myopathy, or active axonal cervical radiculopathy/brachial plexopathy of the right upper extremity.     Plan:  Today's test results will be sent to her referring physician, Dr. Taveras for further review and direction in her treatment.     Thank you very much for the referral. Please call if you have any questions regarding this study or the report.            Assessment:       Encounter Diagnoses   Name Primary?    Fibromyalgia     Myalgia           Plan:        Fibromyalgia  -     methocarbamoL (ROBAXIN) 500 MG Tab; Take 1 tablet (500 mg total) by mouth 2 (two) times daily.  Dispense: 60 tablet; Refill: 6  -     gabapentin (NEURONTIN) 100 MG capsule; Take 1 capsule (100 mg total) by mouth 3 (three) times daily.  Dispense: 90 capsule; Refill: 11  -     meloxicam (MOBIC) 15 MG tablet; Take 1 tablet (15 mg total) by mouth once daily.  Dispense: 30 tablet; Refill: 6    Myalgia  -     methocarbamoL (ROBAXIN) 500 MG Tab; Take 1 tablet (500 mg total) by mouth 2 (two) times daily.  Dispense: 60 tablet; Refill: 6  -     gabapentin (NEURONTIN) 100 MG capsule; Take 1 capsule (100 mg total) by mouth 3 (three) times daily.  Dispense: 90 capsule;  Refill: 11  -     meloxicam (MOBIC) 15 MG tablet; Take 1 tablet (15 mg total) by mouth once daily.  Dispense: 30 tablet; Refill: 6         No evidence for any inflammatory arthritis.     mobic 15mg also PRN.   Robaxin: cpt  Gabapentin CPT    EMG NCS Right UE. -normal    Follow up in about 4 months (around 12/18/2022).      f/u 4-5 months  30 min consultation with greater than 50% spent in counseling, chart review and coordination of care. All questions answered.  Thank you for allowing me to participate in the care of this very pleasant patient.

## 2023-09-13 DIAGNOSIS — M79.7 FIBROMYALGIA: ICD-10-CM

## 2023-09-13 DIAGNOSIS — M79.10 MYALGIA: ICD-10-CM

## 2023-09-18 RX ORDER — MELOXICAM 15 MG/1
15 TABLET ORAL
Qty: 30 TABLET | Refills: 6 | OUTPATIENT
Start: 2023-09-18